# Patient Record
Sex: FEMALE | Race: WHITE | NOT HISPANIC OR LATINO | Employment: FULL TIME | URBAN - NONMETROPOLITAN AREA
[De-identification: names, ages, dates, MRNs, and addresses within clinical notes are randomized per-mention and may not be internally consistent; named-entity substitution may affect disease eponyms.]

---

## 2023-06-06 ENCOUNTER — OFFICE VISIT (OUTPATIENT)
Dept: URGENT CARE | Facility: CLINIC | Age: 38
End: 2023-06-06
Payer: OTHER GOVERNMENT

## 2023-06-06 VITALS
TEMPERATURE: 98.4 F | HEART RATE: 78 BPM | RESPIRATION RATE: 16 BRPM | DIASTOLIC BLOOD PRESSURE: 60 MMHG | SYSTOLIC BLOOD PRESSURE: 96 MMHG | OXYGEN SATURATION: 100 %

## 2023-06-06 DIAGNOSIS — J02.9 SORE THROAT: Primary | ICD-10-CM

## 2023-06-06 DIAGNOSIS — H65.93 MIDDLE EAR EFFUSION, BILATERAL: ICD-10-CM

## 2023-06-06 LAB — S PYO AG THROAT QL: NEGATIVE

## 2023-06-06 PROCEDURE — 99203 OFFICE O/P NEW LOW 30 MIN: CPT

## 2023-06-06 PROCEDURE — G0463 HOSPITAL OUTPT CLINIC VISIT: HCPCS

## 2023-06-06 PROCEDURE — 87880 STREP A ASSAY W/OPTIC: CPT

## 2023-06-06 RX ORDER — MELOXICAM 15 MG/1
15 TABLET ORAL DAILY
COMMUNITY
Start: 2023-04-20

## 2023-06-06 RX ORDER — TRAZODONE HYDROCHLORIDE 50 MG/1
50 TABLET ORAL EVERY EVENING
COMMUNITY
Start: 2023-05-23

## 2023-06-06 RX ORDER — VALACYCLOVIR HYDROCHLORIDE 500 MG/1
TABLET, FILM COATED ORAL
COMMUNITY
Start: 2023-05-23

## 2023-06-06 RX ORDER — FLUTICASONE PROPIONATE 50 MCG
2 SPRAY, SUSPENSION (ML) NASAL DAILY
Qty: 11.1 ML | Refills: 0 | Status: SHIPPED | OUTPATIENT
Start: 2023-06-06

## 2023-06-06 NOTE — PROGRESS NOTES
"  [facility]        NAME: Lola Mullins is a 42 y o  female  : 1985    MRN: [id number]  DATE: 2023  TIME: 10:38 AM    Assessment and Plan   Sore throat [J02 9]  1  Sore throat  POCT rapid strepA      2  Middle ear effusion, bilateral  fluticasone (FLONASE) 50 mcg/act nasal spray        Rapid strep negative  Symptoms most likely due to pollen/dust exposure over the weekend causing some postnasal drip resulting in a scratchy throat  Bilateral ear effusion present without erythema  Sending in [place]  Given advice for home remedies  Advised to follow-up with family doctor  Given return to work note  Patient Instructions     Take prescribed medication as instructed  Tylenol or ibuprofen for any pain or fever  Make sure to stay well-hydrated  May try warm tea with honey, teaspoon of honey, throat lozenges to help with sore throat symptoms  May perform Valsalva maneuver to \"pop\" ears and promote drainage  Daily antihistamine such as Claritin, Allegra, Xyzal, Zyrtec, etc   If no improvement, follow-up with your family doctor  Symptoms worsen, return or go to the ER  Follow up with PCP in 3-5 days  Proceed to  ER if symptoms worsen  Chief Complaint     Chief Complaint   Patient presents with   • Sore Throat     Started 1 day ago  No OTC medication  Request note for work   • Ear Fullness         History of Present Illness       20-year-old female presents with sore throat this been going on for the last 1 day  PT also admits to bilateral ear pressure and feeling like she is \"in a tunnel\"  Denies seasonal allergies  PT states that 2 days ago she was working in her yard and doing some and house projects where she may have encountered some dust and pollen  Denies sick contacts  Did not take any over-the-counter medications  Denies any fever, chills, trouble breathing, chest pain, abdominal pain, nausea, vomiting, diarrhea        Review of Systems   Review of Systems " Constitutional: Negative  HENT: Positive for ear pain (pressure) and sore throat  Negative for rhinorrhea, sinus pressure and sinus pain  Eyes: Negative  Respiratory: Negative  Cardiovascular: Negative  Gastrointestinal: Negative  Musculoskeletal: Negative  Skin: Negative  Neurological: Negative  Current Medications       Current Outpatient Medications:   •  fluticasone (FLONASE) 50 mcg/act nasal spray, 2 sprays into each nostril daily, Disp: 11 1 mL, Rfl: 0  •  meloxicam (MOBIC) 15 mg tablet, Take 15 mg by mouth daily With food, Disp: , Rfl:   •  traZODone (DESYREL) 50 mg tablet, Take 50 mg by mouth every evening, Disp: , Rfl:   •  valACYclovir (VALTREX) 500 mg tablet, , Disp: , Rfl:     Current Allergies     Allergies as of 06/06/2023   • (No Known Allergies)            The following portions of the patient's history were reviewed and updated as appropriate: allergies, current medications, past family history, past medical history, past social history, past surgical history and problem list      History reviewed  No pertinent past medical history  Past Surgical History:   Procedure Laterality Date   • SINUS SURGERY     • TONSILLECTOMY         No family history on file  Medications have been verified  Objective   BP 96/60   Pulse 78   Temp 98 4 °F (36 9 °C)   Resp 16   SpO2 100%        Physical Exam     Physical Exam  Constitutional:       General: She is not in acute distress  Appearance: Normal appearance  She is not ill-appearing  HENT:      Head: Normocephalic and atraumatic  Right Ear: Ear canal and external ear normal  No decreased hearing noted  No drainage, swelling or tenderness  A middle ear effusion is present  Tympanic membrane is not erythematous  Left Ear: Ear canal and external ear normal  No decreased hearing noted  No drainage, swelling or tenderness  A middle ear effusion is present  Tympanic membrane is not erythematous  Nose: Nose normal       Mouth/Throat:      Mouth: Mucous membranes are moist       Pharynx: Oropharynx is clear  Posterior oropharyngeal erythema (mild) present  No oropharyngeal exudate  Eyes:      Extraocular Movements: Extraocular movements intact  Conjunctiva/sclera: Conjunctivae normal       Pupils: Pupils are equal, round, and reactive to light  Cardiovascular:      Rate and Rhythm: Normal rate and regular rhythm  Pulses: Normal pulses  Pulmonary:      Effort: Pulmonary effort is normal       Breath sounds: Normal breath sounds  Musculoskeletal:      Cervical back: Normal range of motion and neck supple  Skin:     General: Skin is warm and dry  Capillary Refill: Capillary refill takes less than 2 seconds  Findings: No rash  Neurological:      General: No focal deficit present  Mental Status: She is alert and oriented to person, place, and time     Psychiatric:         Mood and Affect: Mood normal          Behavior: Behavior normal

## 2023-06-06 NOTE — PATIENT INSTRUCTIONS
"  Take prescribed medication as instructed  Tylenol or ibuprofen for any pain or fever  Make sure to stay well-hydrated  May try warm tea with honey, teaspoon of honey, throat lozenges to help with sore throat symptoms  May perform Valsalva maneuver to \"pop\" ears and promote drainage  Daily antihistamine such as Claritin, Allegra, Xyzal, Zyrtec, etc   If no improvement, follow-up with your family doctor  Symptoms worsen, return or go to the ER  Follow up with PCP in 3-5 days  Proceed to  ER if symptoms worsen  Fluid In The Ear (Serous Otitis Media)   WHAT YOU NEED TO KNOW:   RADHA is fluid trapped in the middle of your ear behind your eardrum  This condition usually develops without signs or symptoms of an ear infection  Serous otitis media may be caused by an upper respiratory infection or allergies  It is most common in the fall and early spring  DISCHARGE INSTRUCTIONS:   Call your doctor if:   You develop severe ear pain  The outside of your ear is red or swollen  You have fluid coming from your ear  You have questions or concerns about your condition or care  How to stay healthy:   Wash your hands often throughout the day  Use soap and water  Rub your soapy hands together, lacing your fingers, for at least 20 seconds  Rinse with warm, running water  Dry your hands with a clean towel or paper towel  Use hand  that contains alcohol if soap and water are not available  Teach children how to wash their hands and use hand   Avoid people who are sick  Some germs are easily and quickly spread through contact  Follow up with your doctor as directed:  Write down your questions so you remember to ask them during your visits  © Copyright [employer] 2022 Information is for End User's use only and may not be sold, redistributed or otherwise used for commercial purposes  The above information is an  only   It is not intended as medical advice for " individual conditions or treatments  Talk to your doctor, nurse or pharmacist before following any medical regimen to see if it is safe and effective for you  Pharyngitis   WHAT YOU NEED TO KNOW:   Pharyngitis, or sore throat, is inflammation of the tissues and structures in your pharynx (throat)  Pharyngitis is most often caused by bacteria or a virus  Other causes include smoking, allergies, or acid reflux  DISCHARGE INSTRUCTIONS:   Call your local emergency number (911 in the [address] [place] [address],[address]) if:   You have trouble breathing or swallowing because your throat is swollen  Return to the emergency department if:   You are drooling because it hurts too much to swallow  Your fever is higher than 102? F (39?C) or lasts longer than 3 days  You are confused  You taste blood  Call your doctor if:   Your throat pain gets worse  You have a painful lump in your throat that does not go away after 5 days  Your symptoms do not improve after 5 days  You have questions or concerns about your condition or care  Medicines:  Viral pharyngitis will go away on its own without treatment  Your sore throat should start to feel better in 3 to 5 days  You may need any of the following:  Antibiotics  treat a bacterial infection  NSAIDs  help decrease swelling and pain or fever  This medicine is available with or without a doctor's order  NSAIDs can cause stomach bleeding or kidney problems in certain people  If you take blood thinner medicine, always ask your healthcare provider if NSAIDs are safe for you  Always read the medicine label and follow directions  Acetaminophen  decreases pain and fever  It is available without a doctor's order  Ask how much to take and how often to take it  Follow directions  Read the labels of all other medicines you are using to see if they also contain acetaminophen, or ask your doctor or pharmacist  Acetaminophen can cause liver damage if not taken correctly      Take your medicine as directed  Contact your healthcare provider if you think your medicine is not helping or if you have side effects  Tell your provider if you are allergic to any medicine  Keep a list of the medicines, vitamins, and herbs you take  Include the amounts, and when and why you take them  Bring the list or the pill bottles to follow-up visits  Carry your medicine list with you in case of an emergency  Manage your symptoms:   Gargle salt water  Mix ¼ teaspoon salt in an 8 ounce glass of warm water and gargle  Do not swallow  Salt water may help decrease swelling in your throat  Drink liquids as directed  You may need to drink more liquids than usual  Liquids may help soothe your throat and prevent dehydration  Ask how much liquid to drink each day and which liquids are best for you  Use a cool mist humidifier  This will add moisture to the air and help decrease your cough  Soothe your throat  Cough drops, ice, soft foods, or popsicles may help decrease throat pain  Prevent the spread of pharyngitis:  Cover your mouth and nose when you cough or sneeze  Do not share food or drinks  Wash your hands often  Use soap and water  If soap and water are unavailable, use an alcohol-based hand   Follow up with your doctor as directed:  Write down your questions so you remember to ask them during your visits  © Copyright Amisha Diaz 2022 Information is for End User's use only and may not be sold, redistributed or otherwise used for commercial purposes  The above information is an  only  It is not intended as medical advice for individual conditions or treatments  Talk to your doctor, nurse or pharmacist before following any medical regimen to see if it is safe and effective for you

## 2023-06-06 NOTE — LETTER
June 6, 2023     Patient: Chela Moctezuma   YOB: 1985   Date of Visit: 6/6/2023       To Whom it May Concern:    Chela Moctezuma was seen in my clinic on 6/6/2023  She may return to work on 6/7/2023   If you have any questions or concerns, please don't hesitate to call           Sincerely,          Omar Huizar PA-C        CC: No Recipients